# Patient Record
Sex: MALE | Race: WHITE | URBAN - METROPOLITAN AREA
[De-identification: names, ages, dates, MRNs, and addresses within clinical notes are randomized per-mention and may not be internally consistent; named-entity substitution may affect disease eponyms.]

---

## 2021-03-08 ENCOUNTER — OFFICE VISIT (OUTPATIENT)
Dept: PRIMARY CARE CLINIC | Age: 21
End: 2021-03-08
Payer: COMMERCIAL

## 2021-03-08 VITALS
HEART RATE: 56 BPM | SYSTOLIC BLOOD PRESSURE: 110 MMHG | TEMPERATURE: 98.6 F | BODY MASS INDEX: 25.15 KG/M2 | DIASTOLIC BLOOD PRESSURE: 66 MMHG | HEIGHT: 69 IN | WEIGHT: 169.8 LBS

## 2021-03-08 DIAGNOSIS — Z13.220 SCREENING CHOLESTEROL LEVEL: ICD-10-CM

## 2021-03-08 DIAGNOSIS — Z11.59 NEED FOR HEPATITIS C SCREENING TEST: ICD-10-CM

## 2021-03-08 DIAGNOSIS — Z11.4 SCREENING FOR HIV (HUMAN IMMUNODEFICIENCY VIRUS): ICD-10-CM

## 2021-03-08 DIAGNOSIS — Z00.00 WELL ADULT EXAM: Primary | ICD-10-CM

## 2021-03-08 DIAGNOSIS — L60.8 MELANONYCHIA: ICD-10-CM

## 2021-03-08 LAB
A/G RATIO: 1.9 (ref 1.1–2.2)
ALBUMIN SERPL-MCNC: 4.6 G/DL (ref 3.4–5)
ALP BLD-CCNC: 52 U/L (ref 40–129)
ALT SERPL-CCNC: 30 U/L (ref 10–40)
ANION GAP SERPL CALCULATED.3IONS-SCNC: 10 MMOL/L (ref 3–16)
AST SERPL-CCNC: 83 U/L (ref 15–37)
BILIRUB SERPL-MCNC: 0.7 MG/DL (ref 0–1)
BUN BLDV-MCNC: 17 MG/DL (ref 7–20)
CALCIUM SERPL-MCNC: 9.9 MG/DL (ref 8.3–10.6)
CHLORIDE BLD-SCNC: 101 MMOL/L (ref 99–110)
CHOLESTEROL, TOTAL: 161 MG/DL (ref 0–199)
CO2: 31 MMOL/L (ref 21–32)
CREAT SERPL-MCNC: 1.2 MG/DL (ref 0.9–1.3)
GFR AFRICAN AMERICAN: >60
GFR NON-AFRICAN AMERICAN: >60
GLOBULIN: 2.4 G/DL
GLUCOSE BLD-MCNC: 76 MG/DL (ref 70–99)
HDLC SERPL-MCNC: 52 MG/DL (ref 40–60)
HEPATITIS C ANTIBODY INTERPRETATION: NORMAL
HIV AG/AB: NORMAL
HIV ANTIGEN: NORMAL
HIV-1 ANTIBODY: NORMAL
HIV-2 AB: NORMAL
LDL CHOLESTEROL CALCULATED: 92 MG/DL
POTASSIUM SERPL-SCNC: 3.8 MMOL/L (ref 3.5–5.1)
SODIUM BLD-SCNC: 142 MMOL/L (ref 136–145)
TOTAL PROTEIN: 7 G/DL (ref 6.4–8.2)
TRIGL SERPL-MCNC: 86 MG/DL (ref 0–150)
VLDLC SERPL CALC-MCNC: 17 MG/DL

## 2021-03-08 PROCEDURE — G8484 FLU IMMUNIZE NO ADMIN: HCPCS | Performed by: FAMILY MEDICINE

## 2021-03-08 PROCEDURE — 1036F TOBACCO NON-USER: CPT | Performed by: FAMILY MEDICINE

## 2021-03-08 PROCEDURE — 99203 OFFICE O/P NEW LOW 30 MIN: CPT | Performed by: FAMILY MEDICINE

## 2021-03-08 PROCEDURE — G8427 DOCREV CUR MEDS BY ELIG CLIN: HCPCS | Performed by: FAMILY MEDICINE

## 2021-03-08 PROCEDURE — G8419 CALC BMI OUT NRM PARAM NOF/U: HCPCS | Performed by: FAMILY MEDICINE

## 2021-03-08 SDOH — ECONOMIC STABILITY: FOOD INSECURITY: WITHIN THE PAST 12 MONTHS, YOU WORRIED THAT YOUR FOOD WOULD RUN OUT BEFORE YOU GOT MONEY TO BUY MORE.: NOT ASKED

## 2021-03-08 SDOH — ECONOMIC STABILITY: TRANSPORTATION INSECURITY
IN THE PAST 12 MONTHS, HAS LACK OF TRANSPORTATION KEPT YOU FROM MEETINGS, WORK, OR FROM GETTING THINGS NEEDED FOR DAILY LIVING?: NOT ASKED

## 2021-03-08 ASSESSMENT — PATIENT HEALTH QUESTIONNAIRE - PHQ9
SUM OF ALL RESPONSES TO PHQ QUESTIONS 1-9: 0
1. LITTLE INTEREST OR PLEASURE IN DOING THINGS: 0
SUM OF ALL RESPONSES TO PHQ QUESTIONS 1-9: 0
2. FEELING DOWN, DEPRESSED OR HOPELESS: 0

## 2021-03-08 ASSESSMENT — ANXIETY QUESTIONNAIRES
7. FEELING AFRAID AS IF SOMETHING AWFUL MIGHT HAPPEN: 1-SEVERAL DAYS
5. BEING SO RESTLESS THAT IT IS HARD TO SIT STILL: 0-NOT AT ALL
2. NOT BEING ABLE TO STOP OR CONTROL WORRYING: 0-NOT AT ALL

## 2021-03-08 ASSESSMENT — ENCOUNTER SYMPTOMS
SORE THROAT: 0
DIARRHEA: 0
RHINORRHEA: 0
COUGH: 0
NAUSEA: 0
VOMITING: 0
SHORTNESS OF BREATH: 0
COLOR CHANGE: 0
ABDOMINAL PAIN: 0
CONSTIPATION: 0
EYE PAIN: 0

## 2021-03-08 NOTE — PROGRESS NOTES
Chief Complaint   Patient presents with    Established New Doctor       HPI:Efrain Proctor presents for evaluation and management of establishment of care and several month history of a dark line down his left thumb nailbed. Haider Cartwright notes that he exercises about 4 times a week getting both cardio and strength training in. Gets about 6 to 7 hours of sleep a night. He does not drink any alcohol does not smoke. Eats a couple servings of vegetables a day. He is in a stable long-term relationship with his girlfriend. 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 He notes that for the last 7 to 8 months he has had a dark line down his left thumb nailbed. He is not sure of any trauma. He has felt well without swollen glands fever chills or fatigue. Review of Systems   Constitutional: Negative for chills and fever. HENT: Negative for ear pain, rhinorrhea and sore throat. Eyes: Negative for pain and visual disturbance. Respiratory: Negative for cough and shortness of breath. Cardiovascular: Negative for chest pain and palpitations. Gastrointestinal: Negative for abdominal pain, constipation, diarrhea, nausea and vomiting. Genitourinary: Negative for dysuria and frequency. Musculoskeletal: Negative for joint swelling and myalgias. Skin: Negative for color change and rash. Neurological: Negative for weakness, numbness and headaches. Hematological: Negative for adenopathy. Does not bruise/bleed easily. Psychiatric/Behavioral: Negative for dysphoric mood, self-injury and suicidal ideas. The patient is not nervous/anxious. No Known Allergies  New Prescriptions    No medications on file     No current outpatient medications on file. No current facility-administered medications for this visit. History reviewed. No pertinent past medical history. History reviewed. No pertinent surgical history.   Family History   Problem Relation Age of Onset  Cancer Father 59        Esophageal CA     Social History     Tobacco Use    Smoking status: Never Smoker    Smokeless tobacco: Never Used   Substance Use Topics    Alcohol use: Yes     Frequency: Never     Binge frequency: Never     Comment: socially    Drug use: Never       Objective   /66   Pulse 56   Temp 98.6 °F (37 °C) (Temporal)   Ht 5' 9.1\" (1.755 m)   Wt 169 lb 12.8 oz (77 kg)   BMI 25.00 kg/m²   Wt Readings from Last 3 Encounters:   03/08/21 169 lb 12.8 oz (77 kg)       Physical Exam  Constitutional:       Appearance: He is well-developed. HENT:      Head: Normocephalic and atraumatic. Nose: Nose normal.      Mouth/Throat:      Pharynx: No oropharyngeal exudate. Eyes:      General: No scleral icterus. Right eye: No discharge. Left eye: No discharge. Pupils: Pupils are equal, round, and reactive to light. Neck:      Musculoskeletal: Normal range of motion and neck supple. Thyroid: No thyromegaly. Cardiovascular:      Rate and Rhythm: Normal rate and regular rhythm. Pulses:           Dorsalis pedis pulses are 2+ on the right side and 2+ on the left side. Posterior tibial pulses are 2+ on the right side and 2+ on the left side. Heart sounds: Normal heart sounds. No murmur. No friction rub. No gallop. Comments: No Edema Lower Extremities  Pulmonary:      Effort: Pulmonary effort is normal.      Breath sounds: Normal breath sounds. No wheezing or rales. Abdominal:      General: Bowel sounds are normal. There is no distension. Palpations: Abdomen is soft. There is no hepatomegaly or splenomegaly. Tenderness: There is no abdominal tenderness. There is no guarding or rebound. Musculoskeletal: Normal range of motion. General: No tenderness or deformity. Lymphadenopathy:      Cervical: No cervical adenopathy. Skin:     General: Skin is warm and dry. Findings: Lesion present. No erythema or rash. Neurological:      Mental Status: He is alert. Cranial Nerves: No cranial nerve deficit. Sensory: No sensory deficit. Gait: Gait normal.   Psychiatric:         Speech: Speech normal.         Behavior: Behavior normal.               Chemistry    No results found for: NA, K, CL, CO2, BUN, CREATININE No results found for: CALCIUM, ALKPHOS, AST, ALT, BILITOT       No results found for: WBC, HGB, HCT, MCV, PLT  No results found for: LABA1C  No results found for: EAG  No results found for: LABA1C  No components found for: CHLPL  No results found for: TRIG  No results found for: HDL  No results found for: LDLCALC  No results found for: LABVLDL      Assessment   Plan     1. Well adult exam  Appears well:  Counselled diet, development, anticipatory guidance and safety issues with patient and or parent(s). 2. Melanonychia  Differential diagnosis includes fungal infection and melanoma as well as trauma. I consult the Dr. Edgar Erazo in the office today who graciously came over to examine him. Presumptive plan will be to set up biopsy  - Thais Dykes MD, Dermatology, [facility]    3. Screening cholesterol level  Screen  - Comprehensive Metabolic Panel; Future  - Lipid Panel; Future    4. Need for hepatitis C screening test  Screen  - Hepatitis C Antibody; Future    5. Screening for HIV (human immunodeficiency virus)  Screen  - HIV Screen; Future    Efrain received counseling on the following healthy behaviors: nutrition and exercise    Patient given educational materials on Nutrition and Exercise  Discussed use, benefit, and side effects of prescribed medications. Barriers to medication compliance addressed. All patient questions answered. Pt voiced understanding.          Health Maintenance   Topic Date Due    Hepatitis C screen  Never done    Varicella vaccine (1 of 2 - 2-dose childhood series) Never done    HPV vaccine (1 - Male 2-dose series) Never done    HIV screen  Never done   Irasema Goins DTaP/Tdap/Td vaccine (1 - Tdap) Never done    Flu vaccine (1) Never done    Hepatitis A vaccine  Aged Out    Hepatitis B vaccine  Aged Out    Hib vaccine  Aged Out    Meningococcal (ACWY) vaccine  Aged Out    Pneumococcal 0-64 years Vaccine  Aged Out       RTC 1 month and as needed

## 2021-03-08 NOTE — PATIENT INSTRUCTIONS
Examine your lifestyle and the barriers to bad and good habits and how you can design your life to make better choices    If you want to feel better these are the FUNDAMENTAL PILLARS of Wellness:    Make it EASY to do the RIGHT THINGS. 1)  You can choose to Get 150 min/week of moderate exercise (can talk but can't sing) or 75 min/week of vigorous exercise (can't talk)   This will enhance your sense of well being (Exercise is as good as medicine for depression.)    2)  You can choose to Get 7-9 hours of sleep per night    Detoxifies your brain, reduces risk of dementia    3)  You can choose to Strength Train 2 x a week on non-consecutive days   This will improve function and reduce risk of injury. Body weight type exercises such as Yoga and Pilates are good    4)  You can choose good nutrition. Only eat your goal weight (in lbs) x 10 calories/day and get 5 servings of Vegetables/day   Plant based diets reduce risk of heart attack/stroke and will help you feel full on less food. Avoid highly processed foods and processed carbohydrates. 5)  You can choose moderate alcohol intake < 1-2 drinks/day   Alcohol will disrupt your sleep and add calories to your day    6)  You can choose to develop a Charismatic/Supportive relationship. This will strengthen your resilience for the ups and downs. 7)  You can choose to Practice Mindfulness. An hour a day of prayer/meditation/gratitude will change your life! If you are trying to lose weight, here are some recommendations for weight loss:  Not every weight loss program is appropriate for everybody. ..  good online sources include Noom (more social with daily check ins), Lifesum (similar but less social) and Naturally slim, as well as Brandneu ($1500)    The GI Diet or \"Primal diet\", Intermittent fasting can also be effective choices. If you have diabetes treated with insulin be sure to ask me for specific guidance around meals.     Take your desired weight in pounds and multiply by 10 and that is your average daily calorie allowance. For example if you wish to weigh 170 lb x 10 = 1700 blanquita/day (this is how to gradually lose the weight and maintain your desired weight). Avoid soda/coke and all \"wet carbs\" => Drink ice water instead    Drink a large glass of ice water before meals and EAT SLOWLY (talk while you eat)! Rethink your hunger => it means your losing weight. Minimize highly processed carbohydrates as they stimulate your appetite:  Specifically cut back on Bread, Rice, Pasta and Potatoes    Avoid eating calories after 6 pm      Patient Education        Well Visit, Ages 25 to 48: Care Instructions  Your Care Instructions     Physical exams can help you stay healthy. Your doctor has checked your overall health and may have suggested ways to take good care of yourself. He or she also may have recommended tests. At home, you can help prevent illness with healthy eating, regular exercise, and other steps. Follow-up care is a key part of your treatment and safety. Be sure to make and go to all appointments, and call your doctor if you are having problems. It's also a good idea to know your test results and keep a list of the medicines you take. How can you care for yourself at home? · Reach and stay at a healthy weight. This will lower your risk for many problems, such as obesity, diabetes, heart disease, and high blood pressure. · Get at least 30 minutes of physical activity on most days of the week. Walking is a good choice. You also may want to do other activities, such as running, swimming, cycling, or playing tennis or team sports. Discuss any changes in your exercise program with your doctor. · Do not smoke or allow others to smoke around you. If you need help quitting, talk to your doctor about stop-smoking programs and medicines. These can increase your chances of quitting for good.   · Talk to your doctor about whether you have any risk factors for sexually transmitted infections (STIs). Having one sex partner (who does not have STIs and does not have sex with anyone else) is a good way to avoid these infections. · Use birth control if you do not want to have children at this time. Talk with your doctor about the choices available and what might be best for you. · Protect your skin from too much sun. When you're outdoors from 10 a.m. to 4 p.m., stay in the shade or cover up with clothing and a hat with a wide brim. Wear sunglasses that block UV rays. Even when it's cloudy, put broad-spectrum sunscreen (SPF 30 or higher) on any exposed skin. · See a dentist one or two times a year for checkups and to have your teeth cleaned. · Wear a seat belt in the car. Follow your doctor's advice about when to have certain tests. These tests can spot problems early. For everyone  · Cholesterol. Have the fat (cholesterol) in your blood tested after age 21. Your doctor will tell you how often to have this done based on your age, family history, or other things that can increase your risk for heart disease. · Blood pressure. Have your blood pressure checked during a routine doctor visit. Your doctor will tell you how often to check your blood pressure based on your age, your blood pressure results, and other factors. · Vision. Talk with your doctor about how often to have a glaucoma test.  · Diabetes. Ask your doctor whether you should have tests for diabetes. · Colon cancer. Your risk for colorectal cancer gets higher as you get older. Some experts say that adults should start regular screening at age 48 and stop at age 76. Others say to start before age 48 or continue after age 76. Talk with your doctor about your risk and when to start and stop screening.   For women  · Breast exam and mammogram. Talk to your doctor about when you should have a clinical breast exam and a mammogram. Medical experts differ on whether and how often women under 50 should have these tests. Your doctor can help you decide what is right for you. · Cervical cancer screening test and pelvic exam. Begin with a Pap test at age 24. The test often is part of a pelvic exam. Starting at age 27, you may choose to have a Pap test, an HPV test, or both tests at the same time (called co-testing). Talk with your doctor about how often to have testing. · Tests for sexually transmitted infections (STIs). Ask whether you should have tests for STIs. You may be at risk if you have sex with more than one person, especially if your partners do not wear condoms. For men  · Tests for sexually transmitted infections (STIs). Ask whether you should have tests for STIs. You may be at risk if you have sex with more than one person, especially if you do not wear a condom. · Testicular cancer exam. Ask your doctor whether you should check your testicles regularly. · Prostate exam. Talk to your doctor about whether you should have a blood test (called a PSA test) for prostate cancer. Experts differ on whether and when men should have this test. Some experts suggest it if you are older than 39 and are -American or have a father or brother who got prostate cancer when he was younger than 72. When should you call for help? Watch closely for changes in your health, and be sure to contact your doctor if you have any problems or symptoms that concern you. Where can you learn more? Go to [email or web address]. org and sign in to your [employer] account. Enter P072 in the SHARKMAR[employer] box to learn more about \"Well Visit, Ages 25 to 48: Care Instructions. \"     If you do not have an account, please click on the \"Sign Up Now\" link. Current as of: May 27, 2020               Content Version: 12.6  © 1116-1251 [employer]. Care instructions adapted under license by [facility] ([facility]).  If you have questions about a medical condition or this instruction, always ask your healthcare professional. [address] any warranty or liability for your use of this information.

## 2021-03-09 ENCOUNTER — TELEPHONE (OUTPATIENT)
Dept: DERMATOLOGY | Age: 21
End: 2021-03-09

## 2021-03-09 PROBLEM — R74.01 ELEVATED AST (SGOT): Status: ACTIVE | Noted: 2021-03-09

## 2021-03-10 ENCOUNTER — TELEPHONE (OUTPATIENT)
Dept: DERMATOLOGY | Age: 21
End: 2021-03-10

## 2021-03-10 NOTE — TELEPHONE ENCOUNTER
patient is returning call and is asking return call to be to returned to his mother Rey Salamanca @ [phone]. Regarding how to handle vacation, what he can use to cover his thumb. Patient works nights. Please advise. Thank you!

## 2021-03-25 ENCOUNTER — OFFICE VISIT (OUTPATIENT)
Dept: DERMATOLOGY | Age: 21
End: 2021-03-25
Payer: COMMERCIAL

## 2021-03-25 VITALS — TEMPERATURE: 97.8 F

## 2021-03-25 DIAGNOSIS — D22.9 MULTIPLE BENIGN MELANOCYTIC NEVI: ICD-10-CM

## 2021-03-25 DIAGNOSIS — L60.8 MELANONYCHIA STRIATA: Primary | ICD-10-CM

## 2021-03-25 DIAGNOSIS — L70.0 ACNE VULGARIS: ICD-10-CM

## 2021-03-25 DIAGNOSIS — L81.4 SOLAR LENTIGINOSIS: ICD-10-CM

## 2021-03-25 PROCEDURE — G8427 DOCREV CUR MEDS BY ELIG CLIN: HCPCS | Performed by: DERMATOLOGY

## 2021-03-25 PROCEDURE — 99203 OFFICE O/P NEW LOW 30 MIN: CPT | Performed by: DERMATOLOGY

## 2021-03-25 PROCEDURE — G8484 FLU IMMUNIZE NO ADMIN: HCPCS | Performed by: DERMATOLOGY

## 2021-03-25 PROCEDURE — G8419 CALC BMI OUT NRM PARAM NOF/U: HCPCS | Performed by: DERMATOLOGY

## 2021-03-25 RX ORDER — CLINDAMYCIN AND BENZOYL PEROXIDE 10; 50 MG/G; MG/G
GEL TOPICAL
Qty: 50 G | Refills: 5 | Status: SHIPPED | OUTPATIENT
Start: 2021-03-25

## 2021-03-25 NOTE — Clinical Note
Guy Reddy,    I was wondering if it is possible to get this patient in soon for a nail matrix biopsy of the melanocytic lesion on this his left thumbnail. 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 I know how busy you are, so if you're not able to, I understand.     Thanks,  [employer]

## 2021-03-25 NOTE — PROGRESS NOTES
moles  Patient denies  an immediate family history of melanoma. Past Medical History:  Past Medical History:   Diagnosis Date    Elevated AST (SGOT) 3/9/2021    Melanonychia 3/9/2021       Past Surgical History:  No past surgical history on file. Past Family History:  Family History   Problem Relation Age of Onset    Cancer Father 59        Esophageal CA   Dad passed away from esophageal carcinoma    Allergies:  No Known Allergies    Current Medications:  No current outpatient medications on file. No current facility-administered medications for this visit. Review of Systems:  Constitutional: No fevers, chills or recent illness. Skin: Skin:As per HPI AND otherwise no new, bleeding or symptomatic skin lesions      Objective:     Vitals:    03/25/21 1350   Temp: 97.8 °F (36.6 °C)   TempSrc: Temporal     Physical Examination:  General: alert, comfortable, no apparent distress, well-appearing  Psych: alert, oriented and pleasant  Neuro: oriented to person, place, and time  Skin: Areas examined: head including face, lips, conjunctiva and lids, neck, hair/scalp, chest, including breasts and axilla, abdomen, back, buttocks, right upper extremity, left upper extremity, right lower extremity, left lower extremity, left hand, right hand, digits and nails, Right foot, Left foot and toe nails      All areas examined were within normal limits except those listed below with the appropriate assessment and plan    Assessment and Plan (with relevant objective exam findings):     1. Melanonychia striata/longitudinal  Location/objective findings: Left thumbnail with a 3 mm brown longitudinal band with irregular size and coloration with extension to proximal nail fold. No other nails involved with pigmentation.  -Will refer to Dr. Haylie Wiley for nail matrix biopsy   DDx: aquired melanocytic nevus of nail matrix versus  subungual lentigo versus malignant melanoma       2.  Multiple benign melanocytic nevi Location: torso, extremities and left foot  Objective findings: multiple brown/pink macules and papules without abnormal findings or concerning findings on exam and dermatoscopy    -Counseled the patient that these are benign and need no therapy. Observation for any changes recommended     3. Acne vulgaris mild to moderate  Location/objective findings: on back and shoulders there are several scattered inflammatory papules and post inflammatory erythematous macules, hyperpigmented macules and excoriations. Discussed treatment options of oral antibiotics and topical treatments versus topical management  Patient opted to start topical treatment with Benzaclin. Educated patient regarding potential side effects which include; irritation, peeling, redness, bleaching of fabrics, itching      4. Solar Lentiginosis    Location: sun exposed areas, most prominently on the shoulders      Objective: Numerous lacy brown macules ranging in size from 2-10 mm diameter. The lentigines seen today are benign in character, caused by the sun, and do not require treatment. However, they do occasionally transform into a malignancy, so the patient needs to monitor for changes. They were educated on what a suspicious change would include, change in size or color, and included education on the ABCDs of melanoma. Follow up:  Return visit in 3 months or as needed for change in condition. All questions addressed. Procedure:   No procedure performed                  I saw your patient Christina Pichardo at the date listed above in my Dermatology  clinic in [facility]. Thank you very much for the referral.    My exam findings, assessment and plan can be found in EPIC, I have also attached them below for your convenience. I very much appreciate your referral and the opportunity to participate in this patient's care.  Please don't hesitate to contact me with questions or concerns about our visit or management of this patient in the future.      Yvrose Kulkarni MD, MS

## 2021-04-08 ENCOUNTER — PROCEDURE VISIT (OUTPATIENT)
Dept: SURGERY | Age: 21
End: 2021-04-08
Payer: COMMERCIAL

## 2021-04-08 VITALS — TEMPERATURE: 98.1 F | HEART RATE: 81 BPM | DIASTOLIC BLOOD PRESSURE: 85 MMHG | SYSTOLIC BLOOD PRESSURE: 129 MMHG

## 2021-04-08 DIAGNOSIS — D48.9 NEOPLASM OF UNCERTAIN BEHAVIOR: Primary | ICD-10-CM

## 2021-04-08 PROCEDURE — 11755 BIOPSY NAIL UNIT: CPT | Performed by: DERMATOLOGY

## 2021-04-08 NOTE — PATIENT INSTRUCTIONS
[facility] Office Hours:    Monday-Thursday  7:30 AM-4:30 PM    Friday  9:00 AM-1:00 PM    After your nail procedure. .. Keep the bandage dry and intact for at least 24 hours. After 24 hours, you can remove the bulky bandage and begin doing acetic acid soaks for 5 minutes twice a day for 1 week. (One part vinegar to 4 parts clean water)    Keep the area moist with vaseline or aquaphor (antibiotic ointment only if instructed by your doctor). Covering with nonstick gauze and paper tape or bandaid is recommended. Do not wrap the area too tightly. Do not engage in any strenuous activity for 48 hours. The area may be sensitive, especially in the first 24-72 hours. It will help to keep the area elevated. You can also take Acetaminophen, Ibuprofen or Naproxen to help alleviate any tenderness or pain. If you develop an otherwise unexplained fever at any time, or if the area becomes more painful or red after the first 48 hours, please call the office and let us know.     [phone]

## 2021-04-09 ENCOUNTER — TELEPHONE (OUTPATIENT)
Dept: SURGERY | Age: 21
End: 2021-04-09

## 2021-04-12 NOTE — PROGRESS NOTES
S: Pt presents today referred by Asiya Mcclain MD for evaluation and management of discoloration of:  Location: left thumbnail. Duration:  Several months  Preceding trauma:  None known    Symptoms:  no    History of Melanoma:  no    O:  WDWN, NAD   No allergy to lidocaine or epinephrine  No known peripheral vascular disease, Raynaud's or other circulation disorders. On the left thumbnail is brown linear discrete discoloration. It is  localized to the nail bed. AP:  1. Nail discoloration:  Nail plate removal:  After discussion of risks and benefits of nail removal including pain, bleeding, temporary and permanent nail dystrophy, loss of nail and ingrown nail, the nail to be removed was identified and a verbal time-out was performed. A distal nerve block was performed with 1cc of 1% lidocaine with epinephrine. The PNF was incised at both sides and reflected proximally to reveal the lunula. The nail overlying the matrix was removed in a rectangular fashion to reveal the underlying nail bed. An elliptical excision transversely made at the pigmented portion. Electrocautery performed for hemostasis. pnf was laid back in position and sutured into place with 5-0 Prolene sutures. Nail plate examination:  Linear dark brown uniform line  Nail bed examination:  Dark brown pigment starting at the matrix    After examination of the nail plate and underlying nail bed, clinically, there is  evidence of a primary pigmented disorder. An elliptical excision was made at the matrix for further diagnostic measures at this time. Diagnosis:  Rule out melanocytic process  Specimens:  1  Complications:  none    The patient was given detailed oral and written instructions on how to care for the wound. The patient tolerated the procedure well and left the unit in good condition. RTC in one week for s/r.

## 2021-04-14 ENCOUNTER — TELEPHONE (OUTPATIENT)
Dept: SURGERY | Age: 21
End: 2021-04-14

## 2021-04-14 NOTE — TELEPHONE ENCOUNTER
I do not have any results in epic yet nor have I heard from the pathologist.  As soon as I get the official results I will let the patient know but not sure what zev was referring to as I don't see any results in computer yet.

## 2021-04-14 NOTE — TELEPHONE ENCOUNTER
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 I informed him that our office staff will contact him with the result after Dr. Joce Mclain reviews it. He asked that if we get his voice message to leave the result on his voice message because he could be sleeping.

## 2021-04-14 NOTE — TELEPHONE ENCOUNTER
Call returned to PT and advised Dr. Jihan Quezada message that she does not see any results in [facility]\" and none have been sent to us as yet as well as not hearing from the pathologist as yet either. I advised we will call him as soon as she gets the official results and, goes over and has the treatment plan. PT advised he understands and is not sure either about the [facility]\" information, he appreciated the call back and will wait to hear from us w/results and treatment plan.

## 2021-04-15 ENCOUNTER — TELEPHONE (OUTPATIENT)
Dept: SURGERY | Age: 21
End: 2021-04-15

## 2021-04-15 ENCOUNTER — OFFICE VISIT (OUTPATIENT)
Dept: PRIMARY CARE CLINIC | Age: 21
End: 2021-04-15
Payer: COMMERCIAL

## 2021-04-15 VITALS
SYSTOLIC BLOOD PRESSURE: 125 MMHG | DIASTOLIC BLOOD PRESSURE: 70 MMHG | WEIGHT: 177.4 LBS | HEART RATE: 60 BPM | BODY MASS INDEX: 26.28 KG/M2 | OXYGEN SATURATION: 98 % | TEMPERATURE: 98.1 F | HEIGHT: 69 IN

## 2021-04-15 DIAGNOSIS — R74.01 ELEVATED AST (SGOT): ICD-10-CM

## 2021-04-15 DIAGNOSIS — L60.8 MELANONYCHIA: Primary | ICD-10-CM

## 2021-04-15 LAB
A/G RATIO: 2 (ref 1.1–2.2)
ALBUMIN SERPL-MCNC: 5 G/DL (ref 3.4–5)
ALP BLD-CCNC: 51 U/L (ref 40–129)
ALT SERPL-CCNC: 21 U/L (ref 10–40)
ANION GAP SERPL CALCULATED.3IONS-SCNC: 12 MMOL/L (ref 3–16)
AST SERPL-CCNC: 72 U/L (ref 15–37)
BILIRUB SERPL-MCNC: 0.5 MG/DL (ref 0–1)
BUN BLDV-MCNC: 19 MG/DL (ref 7–20)
CALCIUM SERPL-MCNC: 10 MG/DL (ref 8.3–10.6)
CHLORIDE BLD-SCNC: 98 MMOL/L (ref 99–110)
CO2: 29 MMOL/L (ref 21–32)
CREAT SERPL-MCNC: 1.2 MG/DL (ref 0.9–1.3)
GFR AFRICAN AMERICAN: >60
GFR NON-AFRICAN AMERICAN: >60
GLOBULIN: 2.5 G/DL
GLUCOSE BLD-MCNC: 86 MG/DL (ref 70–99)
POTASSIUM SERPL-SCNC: 3.7 MMOL/L (ref 3.5–5.1)
SODIUM BLD-SCNC: 139 MMOL/L (ref 136–145)
TOTAL PROTEIN: 7.5 G/DL (ref 6.4–8.2)

## 2021-04-15 PROCEDURE — 99213 OFFICE O/P EST LOW 20 MIN: CPT | Performed by: FAMILY MEDICINE

## 2021-04-15 PROCEDURE — 1036F TOBACCO NON-USER: CPT | Performed by: FAMILY MEDICINE

## 2021-04-15 PROCEDURE — G8427 DOCREV CUR MEDS BY ELIG CLIN: HCPCS | Performed by: FAMILY MEDICINE

## 2021-04-15 PROCEDURE — G8419 CALC BMI OUT NRM PARAM NOF/U: HCPCS | Performed by: FAMILY MEDICINE

## 2021-04-15 ASSESSMENT — ENCOUNTER SYMPTOMS
ABDOMINAL PAIN: 0
COUGH: 0
DIARRHEA: 0
SHORTNESS OF BREATH: 0
CONSTIPATION: 0
VOMITING: 0
NAUSEA: 0

## 2021-04-15 NOTE — TELEPHONE ENCOUNTER
Spoke with Be Cesar at [facility] to check on specimen that was sent on 4/8/21. She stated that extra stains were ordered on specimen and that tissue should be processed hopefully by tomorrow. She asked for staff to call back Monday morning if not received.

## 2021-04-15 NOTE — PROGRESS NOTES
Chief Complaint   Patient presents with    Other     Elevated Liver Enzymes Follow       HPI: Dae Moran presents for evaluation and management of elevated liver enzymes and follow-up on melanonychia. Juwan Foster notes that he has been feeling well. He underwent surgery on his thumb 7 days ago. Pathology is not back. He is dealing well with the pending state of his surgical pathology and is aware of the possibility of melanoma. He states that he feels well without abdominal pain. Reports that prior to his last visit he had gone out on the weekend and had a modest amount of alcohol at a [employer] [facility] [employer]. He has not had any alcoholic beverages and several days at this time      Review of Systems   Constitutional: Negative for chills and fever. Respiratory: Negative for cough and shortness of breath. Cardiovascular: Negative for chest pain and palpitations. Gastrointestinal: Negative for abdominal pain, constipation, diarrhea, nausea and vomiting. No Known Allergies  New Prescriptions    No medications on file     Current Outpatient Medications   Medication Sig Dispense Refill    clindamycin-benzoyl peroxide (BENZACLIN) 1-5 % gel Apply to affected areas on shoulders and back once in am. 50 g 5     No current facility-administered medications for this visit. Past Medical History:   Diagnosis Date    Elevated AST (SGOT) 3/9/2021    Melanonychia 3/9/2021         Objective   /70   Pulse 60   Temp 98.1 °F (36.7 °C) (Temporal)   Ht 5' 9.1\" (1.755 m)   Wt 177 lb 6.4 oz (80.5 kg)   SpO2 98%   BMI 26.12 kg/m²   Wt Readings from Last 3 Encounters:   04/15/21 177 lb 6.4 oz (80.5 kg)   03/08/21 169 lb 12.8 oz (77 kg)       Physical Exam  Constitutional:       Appearance: He is well-developed. Cardiovascular:      Rate and Rhythm: Normal rate and regular rhythm. Heart sounds: No murmur. No friction rub. No gallop.     Pulmonary:      Effort: Pulmonary effort is normal.      Breath sounds: Normal breath sounds. No wheezing or rales. Abdominal:      General: Bowel sounds are normal. There is no distension. Palpations: Abdomen is soft. There is no mass. Tenderness: There is no abdominal tenderness. Musculoskeletal:      Comments: Left thumb with bandage   Skin:     General: Skin is warm and dry. Findings: No rash. Chemistry        Component Value Date/Time     03/08/2021 1000    K 3.8 03/08/2021 1000     03/08/2021 1000    CO2 31 03/08/2021 1000    BUN 17 03/08/2021 1000    CREATININE 1.2 03/08/2021 1000        Component Value Date/Time    CALCIUM 9.9 03/08/2021 1000    ALKPHOS 52 03/08/2021 1000    AST 83 (H) 03/08/2021 1000    ALT 30 03/08/2021 1000    BILITOT 0.7 03/08/2021 1000          No results found for: WBC, HGB, HCT, MCV, PLT  No results found for: LABA1C  No results found for: EAG  No results found for: LABA1C  No components found for: CHLPL  Lab Results   Component Value Date    TRIG 86 03/08/2021     Lab Results   Component Value Date    HDL 52 03/08/2021     Lab Results   Component Value Date    LDLCALC 92 03/08/2021     Lab Results   Component Value Date    LABVLDL 17 03/08/2021         Assessment   Plan     1. Melanonychia  Awaiting pathology report. Follow-up pending these results    2. Elevated AST (SGOT)  Counseled patient that his elevated liver enzyme profile favored influence of alcohol. He states he does not drink much. We will recheck labs today  - Comprehensive Metabolic Panel; Future  Discussed use, benefit, and side effects of prescribed medications. Barriers to medication compliance addressed. All patient questions answered. Pt voiced understanding. RTC Return if symptoms worsen or fail to improve.

## 2021-04-16 ENCOUNTER — PATIENT MESSAGE (OUTPATIENT)
Dept: PRIMARY CARE CLINIC | Age: 21
End: 2021-04-16

## 2021-04-16 DIAGNOSIS — R74.01 ELEVATED AST (SGOT): Primary | ICD-10-CM

## 2021-04-16 DIAGNOSIS — R74.01 ELEVATED AST (SGOT): ICD-10-CM

## 2021-04-16 LAB
HBV SURFACE AB TITR SER: >1000 MIU/ML
HEPATITIS B SURFACE ANTIGEN INTERPRETATION: NORMAL
IRON SATURATION: 59 % (ref 20–50)
IRON: 138 UG/DL (ref 59–158)
TOTAL IRON BINDING CAPACITY: 235 UG/DL (ref 260–445)

## 2021-04-16 NOTE — TELEPHONE ENCOUNTER
From: Kelsie Singh  To: Tracey Mendoza MD  Sent: 4/16/2021 8:54 AM EDT  Subject: Test Results Question    I just called to schedule the ultrasound and they did not have the consult in yet so they are unable to schedule me at this time.

## 2021-04-17 DIAGNOSIS — R74.01 ELEVATED AST (SGOT): Primary | ICD-10-CM

## 2021-04-18 LAB — HEPATITIS B CORE TOTAL ANTIBODY: NEGATIVE

## 2021-04-19 ENCOUNTER — TELEPHONE (OUTPATIENT)
Dept: DERMATOLOGY | Age: 21
End: 2021-04-19

## 2021-04-20 ENCOUNTER — TELEPHONE (OUTPATIENT)
Dept: SURGERY | Age: 21
End: 2021-04-20

## 2021-04-20 NOTE — TELEPHONE ENCOUNTER
Contacted patient to ensure that bx results had been reviewed with him (reviewed with Dr. Jason Hooker). Asked if he had any questions/concerns to discuss with Dr. Martine Quarles at suture removal appointment tomorrow; pt states he does not have questions/concerns.  Changed appointment to nurse visit tomorrow, 4/21/21, at 2:30PM.

## 2021-04-21 ENCOUNTER — NURSE ONLY (OUTPATIENT)
Dept: SURGERY | Age: 21
End: 2021-04-21

## 2021-04-21 DIAGNOSIS — Z48.02 VISIT FOR SUTURE REMOVAL: Primary | ICD-10-CM

## 2021-04-21 DIAGNOSIS — R74.01 ELEVATED AST (SGOT): ICD-10-CM

## 2021-04-21 LAB
BASOPHILS ABSOLUTE: 0 K/UL (ref 0–0.2)
BASOPHILS RELATIVE PERCENT: 0.6 %
EOSINOPHILS ABSOLUTE: 0.1 K/UL (ref 0–0.6)
EOSINOPHILS RELATIVE PERCENT: 2 %
FERRITIN: 229.3 NG/ML (ref 30–400)
HCT VFR BLD CALC: 41.6 % (ref 40.5–52.5)
HEMOGLOBIN: 14.5 G/DL (ref 13.5–17.5)
LYMPHOCYTES ABSOLUTE: 1.4 K/UL (ref 1–5.1)
LYMPHOCYTES RELATIVE PERCENT: 29.9 %
MCH RBC QN AUTO: 31.9 PG (ref 26–34)
MCHC RBC AUTO-ENTMCNC: 35 G/DL (ref 31–36)
MCV RBC AUTO: 91 FL (ref 80–100)
MONOCYTES ABSOLUTE: 0.3 K/UL (ref 0–1.3)
MONOCYTES RELATIVE PERCENT: 5.8 %
NEUTROPHILS ABSOLUTE: 2.9 K/UL (ref 1.7–7.7)
NEUTROPHILS RELATIVE PERCENT: 61.7 %
PDW BLD-RTO: 12.9 % (ref 12.4–15.4)
PLATELET # BLD: 152 K/UL (ref 135–450)
PMV BLD AUTO: 8.7 FL (ref 5–10.5)
RBC # BLD: 4.57 M/UL (ref 4.2–5.9)
WBC # BLD: 4.7 K/UL (ref 4–11)

## 2021-04-21 NOTE — PATIENT INSTRUCTIONS
[facility] Office Hours:    Monday-Thursday  7:30 AM-4:30 PM    Friday  9:00 AM-1:00 PM    WOUND CARE AFTER SUTURE REMOVAL    After your stiches have been removed, your scar is still very fragile. In fact, scars continue to change and evolve, what we call remodel, for about a year after your procedure. Follow the following steps below to ensure that your scar heals well. Instructions    1. If Steri-strips were applied, keep them on until they fall off on their own. 2. Protect your scar from the sun. Use a sunscreen or bandage to cover your scar. Jonetta Gino exposure can cause your scar to become discolored and appear red or brown. 3. To help soften your scar more rapidly, it is helpful, but not necessary, for you to   massage the scar gently each night for twenty minutes. 4. Spitting suture. Occasionally, an inside suture (stitch) does not completely dissolve. When this happens, (generally 4-8 weeks after surgery), it causes a bump or pimple to form on the scar. This is easily removed and is not at all serious. It   does not mean the skin cancer has returned. Contact us if it happens, but do not be alarmed. 5. If you scar becomes tender, itchy or becomes very large, let Dr. Rosalva Adams know. There    are treatments that can improve the appearance of your scar or help make it more comfortable.

## 2021-04-21 NOTE — PROGRESS NOTES
S:  The patient is here for suture removal s/p biopsy on the left thumbnail 2 week(s) ago. The site appears well-healed without signs of infection (redness, pain or discharge). The sutures were removed. Daily Wound care and activity instructions given. The patient was scheduled for follow-up prn for scar/wound check. The patient was scheduled for f/u with General Dermatology per their instructions.

## 2021-04-26 DIAGNOSIS — R74.8 ELEVATED LIVER ENZYMES: Primary | ICD-10-CM

## 2021-04-27 LAB
C282Y HEMOCHROMATOSIS MUT: NEGATIVE
H63D HEMOCHROMATOSIS MUT: NORMAL
HEMOCHROMATOSIS GENE ANALYSIS: NORMAL
HFE PCR SPECIMEN: NORMAL
S65C HEMOCHROMATOSIS MUT: NEGATIVE

## 2021-08-26 ENCOUNTER — TELEPHONE (OUTPATIENT)
Dept: PRIMARY CARE CLINIC | Age: 21
End: 2021-08-26

## 2021-08-26 ENCOUNTER — TELEPHONE (OUTPATIENT)
Dept: SURGERY | Age: 21
End: 2021-08-26

## 2021-08-26 NOTE — TELEPHONE ENCOUNTER
Pt states he received a bill for $282 from [facility] ([facility]). I checked his account and didn't see a balance. I see the ins was billed for $222, pt pd copay of $20 and $185 was pd by [employer] and the remaining balance was a contractual right off. He states that he notified his insurance to see why they didn't pay the bill and they said they required a preauthorization letter for the suture removal date. I requested that he send me the bill and I would forward to [facility] billing to iron out matter but to call billing also to have them reset his billing cycle.

## 2021-08-26 NOTE — TELEPHONE ENCOUNTER
Spoke with Mom about her Billing issues. Mom spoke with Ted regarding the Biopsy that was done on his thumb. [facility] is telling the mother that in the past a PA for this biopsy was not needed HOWEVER now it is required. Mom said [facility] just needs to have a PA for the biopsy and the claim will be paid. Rena Allen in Dr. Pedro Luis Jose office but she was unavailable. Advised them I would try back today.

## 2021-08-27 NOTE — TELEPHONE ENCOUNTER
Spoke with Lois Lopes at [facility] and requested a retro authorization for hemochromatosis lab test.  Per Lois Lopes, we are unable to request a retro authorization. She states the denied claim would need to be resubmitted along with supporting documentation stating medical necessity for the procedure (office notes). The denied claim and supporting documentation needs to be faxed to [phone] along with a note formally requesting an appeal.  I called Lab Billing and spoke with Tereso Jimenez.   She is having her supervisor send the denied claim to me and I will fax the denied claim along with supporting documents to [facility] to appeal the denial.

## 2021-08-27 NOTE — TELEPHONE ENCOUNTER
I spoke with the Lab , [place] at ext: [phone], she informed me that the bill that was generated was from lab work ordered by Dr. Claude Bell on 4/21/2021 and that one of the tests \"HFE-Gene\" was denied due to not having it pre-authorized. She informed me to correct this matter was to have Dr. Claude Bell office obtain the \"retro-authorization\" and fax it to [phone] to her attention. She would then forward it to the proper department to resubmit the bill. [place] also had the billing cycle reset so the patient does not receive a bill during this matter. I have notified the patient's mother and explained and attempted to call pt but there was no answer and I contacted the manager at Dr. Claude Bell and she is having the matter handled by her staff. I have included Dr. Melo Peers staff on this message for f/u with the patient and billing. The number I called to reach [place] was [phone].

## 2024-11-29 NOTE — TELEPHONE ENCOUNTER
The patient was in the office on 4/8/21 for excisional biopsy located on the L thumbnail. The patient tolerated the procedure well and left the office in good condition. A post-operative telephone call was placed at 11:26AM in order to check on the patient's recovery process. The patient was unavailable and the voicemail box is not set up so this nurse was unable to leave a voicemail.
No